# Patient Record
Sex: MALE | Race: WHITE | NOT HISPANIC OR LATINO | Employment: FULL TIME | URBAN - METROPOLITAN AREA
[De-identification: names, ages, dates, MRNs, and addresses within clinical notes are randomized per-mention and may not be internally consistent; named-entity substitution may affect disease eponyms.]

---

## 2020-08-24 ENCOUNTER — HOSPITAL ENCOUNTER (EMERGENCY)
Facility: HOSPITAL | Age: 25
Discharge: HOME | End: 2020-08-24
Attending: EMERGENCY MEDICINE | Admitting: EMERGENCY MEDICINE

## 2020-08-24 VITALS
DIASTOLIC BLOOD PRESSURE: 99 MMHG | TEMPERATURE: 98.6 F | SYSTOLIC BLOOD PRESSURE: 132 MMHG | WEIGHT: 130 LBS | BODY MASS INDEX: 16.68 KG/M2 | HEIGHT: 74 IN | OXYGEN SATURATION: 100 % | HEART RATE: 74 BPM | RESPIRATION RATE: 16 BRPM

## 2020-08-24 DIAGNOSIS — K04.7 DENTAL ABSCESS: Primary | ICD-10-CM

## 2020-08-24 DIAGNOSIS — K02.9 TOOTH DECAY: ICD-10-CM

## 2020-08-24 DIAGNOSIS — L03.211 FACIAL CELLULITIS: ICD-10-CM

## 2020-08-24 PROCEDURE — 99282 EMERGENCY DEPT VISIT SF MDM: CPT

## 2020-08-24 RX ORDER — AMOXICILLIN AND CLAVULANATE POTASSIUM 875; 125 MG/1; MG/1
1 TABLET, FILM COATED ORAL EVERY 12 HOURS
Qty: 14 TABLET | Refills: 0 | Status: SHIPPED | OUTPATIENT
Start: 2020-08-24 | End: 2020-08-31

## 2020-08-24 NOTE — ED PROVIDER NOTES
EMERGENCY DEPARTMENT ENCOUNTER      Pt Name: Wesley Edwards  MRN: [id number]  YOB: 1995  Date of evaluation: 8/24/2020  Provider: Aleks Rivera MD    CHIEF COMPLAINT       Chief Complaint   Patient presents with   • Dental Pain         HISTORY OF PRESENT ILLNESS  (Location/Symptom, Timing/Onset, Context/Setting, Quality, Duration, Modifying Factors, Severity.)   Wesley Edwards is a 25 y.o. male who presents to the emergency department with left-sided jaw swelling for the past week that he describes as moderate in severity and aching in character without any associated fever or chills and no apparent modifying factors.  He was evaluated at  and had incision and drainage performed of dental abscess but was not sent home with any antibiotics.  He is requesting antibiotic prescription today.  He does not currently have a dentist.      Nursing notes were reviewed.    REVIEW OF SYSTEMS    (2-9 systems for level 4, 10 or more for level 5)   ROS:  General:  No fevers, no chills, no weakness  Cardiovascular:  No chest pain, no palpitations  Respiratory:  No shortness of breath, no cough, no wheezing  Gastrointestinal:  No pain, no nausea, no vomiting, no diarrhea  Musculoskeletal:  No muscle pain, no joint pain  Skin:  No rash, no easy bruising  Neurologic:  No speech problems, no headache, no extremity numbness, no extremity tingling, no extremity weakness  Psychiatric:  No anxiety  Genitourinary:  No dysuria, no hematuria    Except as noted above the remainder of the review of systems was reviewed and negative.       PAST MEDICAL HISTORY   Negative        CURRENT MEDICATIONS     No current facility-administered medications for this encounter.     Current Outpatient Medications:   •  amoxicillin-clavulanate (AUGMENTIN) 875-125 MG per tablet, Take 1 tablet by mouth Every 12 (Twelve) Hours for 7 days., Disp: 14 tablet, Rfl: 0    ALLERGIES     Patient has no known allergies.         SOCIAL HISTORY    "    Social History     Socioeconomic History   • Marital status: Significant Other     Spouse name: Not on file   • Number of children: Not on file   • Years of education: Not on file   • Highest education level: Not on file         PHYSICAL EXAM    (up to 7 for level 4, 8 or more for level 5)     Vitals:    08/24/20 1517 08/24/20 1532   BP: 132/99    Pulse: (!) 29 74   Resp: 16    Temp: 98.6 °F (37 °C)    SpO2: 100%    Weight: 59 kg (130 lb)    Height: 188 cm (74\")        Physical Exam  General: Awake, alert, no acute distress.  HEENT: Conjunctiva normal.  There is mild swelling of the left lower jawline.  Intraoral examination reveals no evidence of fluctuance or drainable mass.  Uvula is midline.  Full range of motion of the neck without difficulty.  The floor the mouth is nontender and nonedematous.  Neck: Trachea midline.  Cardiac: Heart regular rate, rhythm, no murmurs, rubs, or gallops  Lungs: Lungs are clear to auscultation, there is no wheezing, rhonchi, or rales. There is no use of accessory muscles.  Chest wall: There is no tenderness to palpation over the chest wall or over ribs  Abdomen: Abdomen is soft, nontender, nondistended. There is no firm or pulsatile masses, no rebound rigidity or guarding.   Musculoskeletal: No deformity.  Neuro: Alert and oriented x 4.  Dermatology: Skin is warm and dry  Psych: Mentation is grossly normal, cognition is grossly normal. Affect is appropriate.        EMERGENCY DEPARTMENT COURSE and DIFFERENTIAL DIAGNOSIS/MDM:   Vitals:    Vitals:    08/24/20 1517 08/24/20 1532   BP: 132/99    Pulse: (!) 29 74   Resp: 16    Temp: 98.6 °F (37 °C)    SpO2: 100%    Weight: 59 kg (130 lb)    Height: 188 cm (74\")             Patient very well-appearing throughout the duration of his stay in the emergency department.  His examination is consistent with mild facial cellulitis secondary to dental infection.  There is no evidence of any drainable abscess as well as no evidence of deep " space infection of the head or neck such as James's angina.  Will prescribe the patient a course of Augmentin and recommend he follow-up with dentist.    I had a discussion with the patient/family regarding diagnosis, diagnostic results, treatment plan, and medications.  The patient/family indicated understanding of these instructions.  I spent adequate time at the bedside proceeding discharge necessary to personally discuss the aftercare instructions, giving patient education, providing explanations of the results of our evaluations/findings, and my decision making to assure that the patient/family understand the plan of care.  Time was allotted to answer questions at that time and throughout the ED course.  Emphasis was placed on timely follow-up after discharge.  I also discussed the potential for the development of an acute emergent condition requiring further evaluation, admission, or even surgical intervention. I discussed that we found nothing during the visit today indicating the need for further workup, admission, or the presence of an unstable medical condition.  I encouraged the patient to return to the emergency department immediately for ANY concerns, worsening, new complaints, or if symptoms persist and unable to seek follow-up in a timely fashion.  The patient/family expressed understanding and agreement with this plan.  The patient will follow-up with their PCP in 1-2 days for reevaluation.           FINAL IMPRESSION      1. Dental abscess    2. Facial cellulitis    3. Tooth decay          DISPOSITION/PLAN     ED Disposition     ED Disposition Condition Comment    Discharge Stable           PATIENT REFERRED TO:  Provider, No Known  [place] [address]    In 1 week      [facility]  [address] [address] [phone]    If symptoms worsen      DISCHARGE MEDICATIONS:     Medication List      START taking these  medications    amoxicillin-clavulanate 875-125 MG per tablet  Commonly known as:  AUGMENTIN  Take 1 tablet by mouth Every 12 (Twelve) Hours for 7 days.              Comment: Please note this report has been produced using speech recognition software.      Aleks Rivera MD  Attending Emergency Physician                       Aleks Rivera MD  08/24/20 8399

## 2021-11-20 ENCOUNTER — HOSPITAL ENCOUNTER (EMERGENCY)
Facility: HOSPITAL | Age: 26
Discharge: HOME | End: 2021-11-20
Attending: EMERGENCY MEDICINE | Admitting: EMERGENCY MEDICINE

## 2021-11-20 ENCOUNTER — APPOINTMENT (OUTPATIENT)
Dept: CT IMAGING | Facility: HOSPITAL | Age: 26
End: 2021-11-20

## 2021-11-20 VITALS
HEART RATE: 82 BPM | DIASTOLIC BLOOD PRESSURE: 79 MMHG | BODY MASS INDEX: 23.61 KG/M2 | SYSTOLIC BLOOD PRESSURE: 133 MMHG | TEMPERATURE: 98.1 F | OXYGEN SATURATION: 99 % | RESPIRATION RATE: 18 BRPM | WEIGHT: 184 LBS | HEIGHT: 74 IN

## 2021-11-20 DIAGNOSIS — N23 RENAL COLIC ON RIGHT SIDE: Primary | ICD-10-CM

## 2021-11-20 DIAGNOSIS — N20.1 RIGHT URETERAL STONE: ICD-10-CM

## 2021-11-20 LAB
ALBUMIN SERPL-MCNC: 4.1 G/DL (ref 3.5–5.2)
ALBUMIN/GLOB SERPL: 2.3 G/DL
ALP SERPL-CCNC: 54 U/L (ref 39–117)
ALT SERPL W P-5'-P-CCNC: 10 U/L (ref 1–41)
ANION GAP SERPL CALCULATED.3IONS-SCNC: 8 MMOL/L (ref 5–15)
AST SERPL-CCNC: 14 U/L (ref 1–40)
BACTERIA UR QL AUTO: ABNORMAL /HPF
BASOPHILS # BLD AUTO: 0.05 10*3/MM3 (ref 0–0.2)
BASOPHILS NFR BLD AUTO: 0.3 % (ref 0–1.5)
BILIRUB SERPL-MCNC: 0.3 MG/DL (ref 0–1.2)
BILIRUB UR QL STRIP: NEGATIVE
BUN SERPL-MCNC: 12 MG/DL (ref 6–20)
BUN/CREAT SERPL: 12.4 (ref 7–25)
CALCIUM SPEC-SCNC: 8.9 MG/DL (ref 8.6–10.5)
CHLORIDE SERPL-SCNC: 107 MMOL/L (ref 98–107)
CLARITY UR: ABNORMAL
CO2 SERPL-SCNC: 25 MMOL/L (ref 22–29)
COLOR UR: ABNORMAL
CREAT SERPL-MCNC: 0.97 MG/DL (ref 0.76–1.27)
DEPRECATED RDW RBC AUTO: 38.3 FL (ref 37–54)
EOSINOPHIL # BLD AUTO: 0.53 10*3/MM3 (ref 0–0.4)
EOSINOPHIL NFR BLD AUTO: 3.4 % (ref 0.3–6.2)
ERYTHROCYTE [DISTWIDTH] IN BLOOD BY AUTOMATED COUNT: 11.8 % (ref 12.3–15.4)
GFR SERPL CREATININE-BSD FRML MDRD: 93 ML/MIN/1.73
GLOBULIN UR ELPH-MCNC: 1.8 GM/DL
GLUCOSE SERPL-MCNC: 104 MG/DL (ref 65–99)
GLUCOSE UR STRIP-MCNC: NEGATIVE MG/DL
HCT VFR BLD AUTO: 45.2 % (ref 37.5–51)
HGB BLD-MCNC: 15.6 G/DL (ref 13–17.7)
HGB UR QL STRIP.AUTO: ABNORMAL
HOLD SPECIMEN: NORMAL
HOLD SPECIMEN: NORMAL
HYALINE CASTS UR QL AUTO: ABNORMAL /LPF
IMM GRANULOCYTES # BLD AUTO: 0.07 10*3/MM3 (ref 0–0.05)
IMM GRANULOCYTES NFR BLD AUTO: 0.4 % (ref 0–0.5)
KETONES UR QL STRIP: ABNORMAL
LEUKOCYTE ESTERASE UR QL STRIP.AUTO: ABNORMAL
LYMPHOCYTES # BLD AUTO: 1.52 10*3/MM3 (ref 0.7–3.1)
LYMPHOCYTES NFR BLD AUTO: 9.7 % (ref 19.6–45.3)
MCH RBC QN AUTO: 31 PG (ref 26.6–33)
MCHC RBC AUTO-ENTMCNC: 34.5 G/DL (ref 31.5–35.7)
MCV RBC AUTO: 89.7 FL (ref 79–97)
MONOCYTES # BLD AUTO: 0.62 10*3/MM3 (ref 0.1–0.9)
MONOCYTES NFR BLD AUTO: 4 % (ref 5–12)
NEUTROPHILS NFR BLD AUTO: 12.81 10*3/MM3 (ref 1.7–7)
NEUTROPHILS NFR BLD AUTO: 82.2 % (ref 42.7–76)
NITRITE UR QL STRIP: NEGATIVE
NRBC BLD AUTO-RTO: 0 /100 WBC (ref 0–0.2)
PH UR STRIP.AUTO: 6 [PH] (ref 5–8)
PLATELET # BLD AUTO: 265 10*3/MM3 (ref 140–450)
PMV BLD AUTO: 8.7 FL (ref 6–12)
POTASSIUM SERPL-SCNC: 3.8 MMOL/L (ref 3.5–5.2)
PROT SERPL-MCNC: 5.9 G/DL (ref 6–8.5)
PROT UR QL STRIP: ABNORMAL
RBC # BLD AUTO: 5.04 10*6/MM3 (ref 4.14–5.8)
RBC # UR STRIP: ABNORMAL /HPF
REF LAB TEST METHOD: ABNORMAL
SODIUM SERPL-SCNC: 140 MMOL/L (ref 136–145)
SP GR UR STRIP: 1.02 (ref 1–1.03)
SQUAMOUS #/AREA URNS HPF: ABNORMAL /HPF
UROBILINOGEN UR QL STRIP: ABNORMAL
WBC # UR STRIP: ABNORMAL /HPF
WBC NRBC COR # BLD: 15.6 10*3/MM3 (ref 3.4–10.8)
WHOLE BLOOD HOLD SPECIMEN: NORMAL
WHOLE BLOOD HOLD SPECIMEN: NORMAL

## 2021-11-20 PROCEDURE — 25010000002 KETOROLAC TROMETHAMINE PER 15 MG: Performed by: PHYSICIAN ASSISTANT

## 2021-11-20 PROCEDURE — 81001 URINALYSIS AUTO W/SCOPE: CPT | Performed by: EMERGENCY MEDICINE

## 2021-11-20 PROCEDURE — 74176 CT ABD & PELVIS W/O CONTRAST: CPT

## 2021-11-20 PROCEDURE — 99284 EMERGENCY DEPT VISIT MOD MDM: CPT

## 2021-11-20 PROCEDURE — 80053 COMPREHEN METABOLIC PANEL: CPT | Performed by: EMERGENCY MEDICINE

## 2021-11-20 PROCEDURE — 96374 THER/PROPH/DIAG INJ IV PUSH: CPT

## 2021-11-20 PROCEDURE — 85025 COMPLETE CBC W/AUTO DIFF WBC: CPT | Performed by: EMERGENCY MEDICINE

## 2021-11-20 RX ORDER — ONDANSETRON 4 MG/1
4 TABLET, ORALLY DISINTEGRATING ORAL EVERY 8 HOURS PRN
Qty: 12 TABLET | Refills: 0 | Status: SHIPPED | OUTPATIENT
Start: 2021-11-20

## 2021-11-20 RX ORDER — KETOROLAC TROMETHAMINE 30 MG/ML
15 INJECTION, SOLUTION INTRAMUSCULAR; INTRAVENOUS ONCE
Status: COMPLETED | OUTPATIENT
Start: 2021-11-20 | End: 2021-11-20

## 2021-11-20 RX ORDER — OXYCODONE HYDROCHLORIDE AND ACETAMINOPHEN 5; 325 MG/1; MG/1
1 TABLET ORAL EVERY 6 HOURS PRN
Qty: 12 TABLET | Refills: 0 | Status: SHIPPED | OUTPATIENT
Start: 2021-11-20

## 2021-11-20 RX ADMIN — KETOROLAC TROMETHAMINE 15 MG: 30 INJECTION, SOLUTION INTRAMUSCULAR at 22:36

## 2021-11-21 LAB — HOLD SPECIMEN: NORMAL

## 2021-11-21 NOTE — DISCHARGE INSTRUCTIONS
Push fluids, strain all urine, return for uncontrolled pain, fevers, vomiting or any other complications or problems.

## 2021-11-21 NOTE — ED PROVIDER NOTES
Subjective   26-year-old male presents emergency department today with abrupt onset of severe right flank pain.  He states that the pain began in the middle of his back pain rating around the right flank.  States the worst pain is ever had and was unable to sit still.  States he had of nausea and vomiting associated with this.  The pain abruptly went away.  He seen no blood in his urine.  He had no trauma recently.  He reports he had no previous abdominal surgeries.  No prior history of kidney stones.  Things seem to exacerbate or alleviate the pain he just seem to resolve on its own.      History provided by:  Patient   used: No    Flank Pain  Pain location:  R flank  Pain quality: aching and shooting    Pain radiates to:  Groin  Pain severity:  Severe  Onset quality:  Sudden  Timing:  Constant  Progression:  Resolved  Chronicity:  New  Context: not alcohol use, not eating, not previous surgeries, not recent illness, not recent sexual activity, not sick contacts, not suspicious food intake and not trauma    Relieved by:  Nothing  Worsened by:  Nothing  Ineffective treatments:  None tried  Associated symptoms: nausea    Associated symptoms: no anorexia, no chest pain, no chills, no constipation, no cough, no diarrhea, no fever, no hematemesis, no hematochezia, no hematuria and no sore throat    Risk factors: no alcohol abuse, not elderly, has not had multiple surgeries and no recent hospitalization        Review of Systems   Constitutional: Negative for chills and fever.   HENT: Negative for sore throat.    Respiratory: Negative for cough, chest tightness and wheezing.    Cardiovascular: Negative for chest pain and palpitations.   Gastrointestinal: Positive for nausea. Negative for anorexia, constipation, diarrhea, hematemesis and hematochezia.   Genitourinary: Positive for flank pain. Negative for hematuria.   Musculoskeletal: Negative for back pain and neck pain.   Skin: Negative for pallor and  rash.   Psychiatric/Behavioral: Negative.    All other systems reviewed and are negative.      History reviewed. No pertinent past medical history.    No Known Allergies    History reviewed. No pertinent surgical history.    History reviewed. No pertinent family history.    Social History     Socioeconomic History   • Marital status: Significant Other   Tobacco Use   • Smoking status: Current Every Day Smoker     Packs/day: 1.00     Types: Cigarettes   Substance and Sexual Activity   • Alcohol use: Yes   • Drug use: Never           Objective   Physical Exam  Vitals and nursing note reviewed.   Constitutional:       Appearance: He is well-developed.   HENT:      Head: Normocephalic and atraumatic.      Right Ear: External ear normal.      Left Ear: External ear normal.      Nose: Nose normal.   Eyes:      General: No scleral icterus.     Conjunctiva/sclera: Conjunctivae normal.      Pupils: Pupils are equal, round, and reactive to light.   Neck:      Thyroid: No thyromegaly.   Cardiovascular:      Rate and Rhythm: Normal rate and regular rhythm.      Heart sounds: Normal heart sounds.   Pulmonary:      Effort: Pulmonary effort is normal. No respiratory distress.      Breath sounds: Normal breath sounds. No wheezing or rales.   Chest:      Chest wall: No tenderness.   Abdominal:      General: Bowel sounds are normal. There is no distension.      Palpations: Abdomen is soft.      Tenderness: There is no abdominal tenderness.   Musculoskeletal:         General: Normal range of motion.      Cervical back: Normal range of motion.   Lymphadenopathy:      Cervical: No cervical adenopathy.   Skin:     General: Skin is warm and dry.   Neurological:      Mental Status: He is alert and oriented to person, place, and time.      Cranial Nerves: No cranial nerve deficit.      Coordination: Coordination normal.      Deep Tendon Reflexes: Reflexes are normal and symmetric. Reflexes normal.   Psychiatric:         Behavior: Behavior  normal.         Thought Content: Thought content normal.         Judgment: Judgment normal.         Procedures           ED Course                                 Recent Results (from the past 24 hour(s))   Urinalysis With Microscopic If Indicated (No Culture) - Urine, Clean Catch    Collection Time: 11/20/21  8:57 PM    Specimen: Urine, Clean Catch   Result Value Ref Range    Color, UA Orange (A) Yellow, Straw    Appearance, UA Cloudy (A) Clear    pH, UA 6.0 5.0 - 8.0    Specific Gravity, UA 1.023 1.001 - 1.030    Glucose, UA Negative Negative    Ketones, UA Trace (A) Negative    Bilirubin, UA Negative Negative    Blood, UA Large (3+) (A) Negative    Protein,  mg/dL (2+) (A) Negative    Leuk Esterase, UA Trace (A) Negative    Nitrite, UA Negative Negative    Urobilinogen, UA 1.0 E.U./dL 0.2 - 1.0 E.U./dL   Urinalysis, Microscopic Only - Urine, Clean Catch    Collection Time: 11/20/21  8:57 PM    Specimen: Urine, Clean Catch   Result Value Ref Range    RBC, UA Too Numerous to Count (A) None Seen, 0-2 /HPF    WBC, UA 6-12 (A) None Seen, 0-2 /HPF    Bacteria, UA None Seen None Seen, Trace /HPF    Squamous Epithelial Cells, UA 0-2 None Seen, 0-2 /HPF    Hyaline Casts, UA 7-12 0 - 6 /LPF    Methodology Automated Microscopy    Comprehensive Metabolic Panel    Collection Time: 11/20/21  8:58 PM    Specimen: Blood   Result Value Ref Range    Glucose 104 (H) 65 - 99 mg/dL    BUN 12 6 - 20 mg/dL    Creatinine 0.97 0.76 - 1.27 mg/dL    Sodium 140 136 - 145 mmol/L    Potassium 3.8 3.5 - 5.2 mmol/L    Chloride 107 98 - 107 mmol/L    CO2 25.0 22.0 - 29.0 mmol/L    Calcium 8.9 8.6 - 10.5 mg/dL    Total Protein 5.9 (L) 6.0 - 8.5 g/dL    Albumin 4.10 3.50 - 5.20 g/dL    ALT (SGPT) 10 1 - 41 U/L    AST (SGOT) 14 1 - 40 U/L    Alkaline Phosphatase 54 39 - 117 U/L    Total Bilirubin 0.3 0.0 - 1.2 mg/dL    eGFR Non African Amer 93 >60 mL/min/1.73    Globulin 1.8 gm/dL    A/G Ratio 2.3 g/dL    BUN/Creatinine Ratio 12.4 7.0 - 25.0     Anion Gap 8.0 5.0 - 15.0 mmol/L   Green Top (Gel)    Collection Time: 11/20/21  8:58 PM   Result Value Ref Range    Extra Tube Hold for add-ons.    Lavender Top    Collection Time: 11/20/21  8:58 PM   Result Value Ref Range    Extra Tube hold for add-on    Gold Top - SST    Collection Time: 11/20/21  8:58 PM   Result Value Ref Range    Extra Tube Hold for add-ons.    Gray Top    Collection Time: 11/20/21  8:58 PM   Result Value Ref Range    Extra Tube Hold for add-ons.    Light Blue Top    Collection Time: 11/20/21  8:58 PM   Result Value Ref Range    Extra Tube hold for add-on    CBC Auto Differential    Collection Time: 11/20/21  8:58 PM    Specimen: Blood   Result Value Ref Range    WBC 15.60 (H) 3.40 - 10.80 10*3/mm3    RBC 5.04 4.14 - 5.80 10*6/mm3    Hemoglobin 15.6 13.0 - 17.7 g/dL    Hematocrit 45.2 37.5 - 51.0 %    MCV 89.7 79.0 - 97.0 fL    MCH 31.0 26.6 - 33.0 pg    MCHC 34.5 31.5 - 35.7 g/dL    RDW 11.8 (L) 12.3 - 15.4 %    RDW-SD 38.3 37.0 - 54.0 fl    MPV 8.7 6.0 - 12.0 fL    Platelets 265 140 - 450 10*3/mm3    Neutrophil % 82.2 (H) 42.7 - 76.0 %    Lymphocyte % 9.7 (L) 19.6 - 45.3 %    Monocyte % 4.0 (L) 5.0 - 12.0 %    Eosinophil % 3.4 0.3 - 6.2 %    Basophil % 0.3 0.0 - 1.5 %    Immature Grans % 0.4 0.0 - 0.5 %    Neutrophils, Absolute 12.81 (H) 1.70 - 7.00 10*3/mm3    Lymphocytes, Absolute 1.52 0.70 - 3.10 10*3/mm3    Monocytes, Absolute 0.62 0.10 - 0.90 10*3/mm3    Eosinophils, Absolute 0.53 (H) 0.00 - 0.40 10*3/mm3    Basophils, Absolute 0.05 0.00 - 0.20 10*3/mm3    Immature Grans, Absolute 0.07 (H) 0.00 - 0.05 10*3/mm3    nRBC 0.0 0.0 - 0.2 /100 WBC     Note: In addition to lab results from this visit, the labs listed above may include labs taken at another facility or during a different encounter within the last 24 hours. Please correlate lab times with ED admission and discharge times for further clarification of the services performed during this visit.    CT Abdomen Pelvis Without  "Contrast   Final Result   #1 there is a 1 to 2 mm obstructing calculus in the right sacral ureter with mild right hydroureter and fullness of the right renal collecting system. There is a nonobstructing 2 to 3 mm calculus in the right lower pole kidney.   2. Study is otherwise limited by lack of IV contrast media but grossly unremarkable            Signer Name: Мария Zaragoza MD    Signed: 11/20/2021 9:55 PM    Workstation Name: [email or web address]     [facility]        Vitals:    11/20/21 2042 11/20/21 2045 11/20/21 2100   BP: 127/75 130/78 133/79   BP Location: Right arm     Patient Position: Sitting     Pulse: 82     Resp: 18     Temp: 98.1 °F (36.7 °C)     TempSrc: Oral     SpO2: 97% 98% 99%   Weight: 83.5 kg (184 lb)     Height: 188 cm (74\")       Medications   ketorolac (TORADOL) injection 15 mg (15 mg Intravenous Given 11/20/21 2236)     ECG/EMG Results (last 24 hours)     ** No results found for the last 24 hours. **        No orders to display                 MDM  Number of Diagnoses or Management Options  Renal colic on right side: new and requires workup  Right ureteral stone: new and requires workup     Amount and/or Complexity of Data Reviewed  Clinical lab tests: reviewed and ordered  Tests in the radiology section of CPT®: reviewed and ordered  Tests in the medicine section of CPT®: ordered and reviewed  Discuss the patient with other providers: yes    Patient Progress  Patient progress: stable      Final diagnoses:   Renal colic on right side   Right ureteral stone       ED Disposition  ED Disposition     ED Disposition Condition Comment    Discharge Stable           Ben Baez MD  [address] [address] [phone]      Call for appointment         Medication List      New Prescriptions    ondansetron ODT 4 MG disintegrating tablet  Commonly known as: ZOFRAN-ODT  Place 1 tablet on the tongue Every 8 (Eight) Hours As Needed for Nausea.   "   oxyCODONE-acetaminophen 5-325 MG per tablet  Commonly known as: PERCOCET  Take 1 tablet by mouth Every 6 (Six) Hours As Needed for Severe Pain .           Where to Get Your Medications      These medications were sent to [facility]/pharmacy #[id number] - [place], [place] - [address] - [phone]  - [phone]   [address] [facility] [address]    Phone: [phone]   · ondansetron ODT 4 MG disintegrating tablet  · oxyCODONE-acetaminophen 5-325 MG per tablet          Janusz Nguyen PA  11/21/21 2006

## 2025-06-21 ENCOUNTER — HOSPITAL ENCOUNTER (EMERGENCY)
Facility: HOSPITAL | Age: 30
Discharge: HOME | End: 2025-06-21
Attending: EMERGENCY MEDICINE
Payer: MEDICAID

## 2025-06-21 VITALS
HEART RATE: 62 BPM | RESPIRATION RATE: 16 BRPM | HEIGHT: 74 IN | SYSTOLIC BLOOD PRESSURE: 146 MMHG | TEMPERATURE: 98.1 F | BODY MASS INDEX: 26.69 KG/M2 | DIASTOLIC BLOOD PRESSURE: 92 MMHG | OXYGEN SATURATION: 98 % | WEIGHT: 208 LBS

## 2025-06-21 DIAGNOSIS — R07.9 NONSPECIFIC CHEST PAIN: Primary | ICD-10-CM

## 2025-06-21 LAB
ALBUMIN SERPL-MCNC: 4.4 G/DL (ref 3.5–5.2)
ALBUMIN/GLOB SERPL: 2 G/DL
ALP SERPL-CCNC: 69 U/L (ref 39–117)
ALT SERPL W P-5'-P-CCNC: 18 U/L (ref 1–41)
ANION GAP SERPL CALCULATED.3IONS-SCNC: 12 MMOL/L (ref 5–15)
AST SERPL-CCNC: 33 U/L (ref 1–40)
BASOPHILS # BLD AUTO: 0.03 10*3/MM3 (ref 0–0.2)
BASOPHILS NFR BLD AUTO: 0.4 % (ref 0–1.5)
BILIRUB SERPL-MCNC: 0.4 MG/DL (ref 0–1.2)
BILIRUB UR QL STRIP: NEGATIVE
BUN SERPL-MCNC: 9.7 MG/DL (ref 6–20)
BUN/CREAT SERPL: 8.9 (ref 7–25)
CALCIUM SPEC-SCNC: 9.2 MG/DL (ref 8.6–10.5)
CHLORIDE SERPL-SCNC: 105 MMOL/L (ref 98–107)
CLARITY UR: CLEAR
CO2 SERPL-SCNC: 22 MMOL/L (ref 22–29)
COLOR UR: YELLOW
CREAT SERPL-MCNC: 1.09 MG/DL (ref 0.76–1.27)
DEPRECATED RDW RBC AUTO: 38.6 FL (ref 37–54)
EGFRCR SERPLBLD CKD-EPI 2021: 93.6 ML/MIN/1.73
EOSINOPHIL # BLD AUTO: 0.43 10*3/MM3 (ref 0–0.4)
EOSINOPHIL NFR BLD AUTO: 5.8 % (ref 0.3–6.2)
ERYTHROCYTE [DISTWIDTH] IN BLOOD BY AUTOMATED COUNT: 12.4 % (ref 12.3–15.4)
GLOBULIN UR ELPH-MCNC: 2.2 GM/DL
GLUCOSE SERPL-MCNC: 89 MG/DL (ref 65–99)
GLUCOSE UR STRIP-MCNC: NEGATIVE MG/DL
HCT VFR BLD AUTO: 44.1 % (ref 37.5–51)
HGB BLD-MCNC: 15.6 G/DL (ref 13–17.7)
HGB UR QL STRIP.AUTO: NEGATIVE
HOLD SPECIMEN: NORMAL
IMM GRANULOCYTES # BLD AUTO: 0.01 10*3/MM3 (ref 0–0.05)
IMM GRANULOCYTES NFR BLD AUTO: 0.1 % (ref 0–0.5)
KETONES UR QL STRIP: ABNORMAL
LEUKOCYTE ESTERASE UR QL STRIP.AUTO: NEGATIVE
LIPASE SERPL-CCNC: 16 U/L (ref 13–60)
LYMPHOCYTES # BLD AUTO: 2.46 10*3/MM3 (ref 0.7–3.1)
LYMPHOCYTES NFR BLD AUTO: 33.2 % (ref 19.6–45.3)
MCH RBC QN AUTO: 30.2 PG (ref 26.6–33)
MCHC RBC AUTO-ENTMCNC: 35.4 G/DL (ref 31.5–35.7)
MCV RBC AUTO: 85.5 FL (ref 79–97)
MONOCYTES # BLD AUTO: 0.52 10*3/MM3 (ref 0.1–0.9)
MONOCYTES NFR BLD AUTO: 7 % (ref 5–12)
NEUTROPHILS NFR BLD AUTO: 3.96 10*3/MM3 (ref 1.7–7)
NEUTROPHILS NFR BLD AUTO: 53.5 % (ref 42.7–76)
NITRITE UR QL STRIP: NEGATIVE
NRBC BLD AUTO-RTO: 0 /100 WBC (ref 0–0.2)
PH UR STRIP.AUTO: 6 [PH] (ref 5–8)
PLATELET # BLD AUTO: 316 10*3/MM3 (ref 140–450)
PMV BLD AUTO: 8.7 FL (ref 6–12)
POTASSIUM SERPL-SCNC: 4.3 MMOL/L (ref 3.5–5.2)
PROT SERPL-MCNC: 6.6 G/DL (ref 6–8.5)
PROT UR QL STRIP: ABNORMAL
QT INTERVAL: 386 MS
QTC INTERVAL: 395 MS
RBC # BLD AUTO: 5.16 10*6/MM3 (ref 4.14–5.8)
SODIUM SERPL-SCNC: 139 MMOL/L (ref 136–145)
SP GR UR STRIP: 1.03 (ref 1–1.03)
TROPONIN T SERPL HS-MCNC: <6 NG/L
UROBILINOGEN UR QL STRIP: ABNORMAL
WBC NRBC COR # BLD AUTO: 7.41 10*3/MM3 (ref 3.4–10.8)
WHOLE BLOOD HOLD COAG: NORMAL
WHOLE BLOOD HOLD SPECIMEN: NORMAL

## 2025-06-21 PROCEDURE — 93005 ELECTROCARDIOGRAM TRACING: CPT

## 2025-06-21 PROCEDURE — 36415 COLL VENOUS BLD VENIPUNCTURE: CPT

## 2025-06-21 PROCEDURE — 85025 COMPLETE CBC W/AUTO DIFF WBC: CPT | Performed by: EMERGENCY MEDICINE

## 2025-06-21 PROCEDURE — 81003 URINALYSIS AUTO W/O SCOPE: CPT | Performed by: EMERGENCY MEDICINE

## 2025-06-21 PROCEDURE — 99283 EMERGENCY DEPT VISIT LOW MDM: CPT

## 2025-06-21 PROCEDURE — 80053 COMPREHEN METABOLIC PANEL: CPT | Performed by: EMERGENCY MEDICINE

## 2025-06-21 PROCEDURE — 83690 ASSAY OF LIPASE: CPT | Performed by: EMERGENCY MEDICINE

## 2025-06-21 PROCEDURE — 84484 ASSAY OF TROPONIN QUANT: CPT

## 2025-06-21 RX ORDER — SODIUM CHLORIDE 9 MG/ML
10 INJECTION, SOLUTION INTRAMUSCULAR; INTRAVENOUS; SUBCUTANEOUS AS NEEDED
Status: DISCONTINUED | OUTPATIENT
Start: 2025-06-21 | End: 2025-06-22 | Stop reason: HOSPADM

## 2025-06-21 NOTE — Clinical Note
[facility] EMERGENCY DEPARTMENT  [address]  [facility] [address]  Phone: [phone]    Wesley Edwards was seen and treated in our emergency department on 6/21/2025.  He may return to work on 06/23/2025.         Thank you for choosing [facility].    Kwame Conrad, LUIS FERNANDO

## 2025-06-22 NOTE — ED PROVIDER NOTES
Subjective   History of Present Illness  Patient is a 29-year-old gentleman who presents to the emergency department with multiple complaints.  He reports awakening this morning with some bilateral flank pain, darkened urine output which is since improved.  He is also concerned around potential pesticide exposure, chemical toxicity.  He had applied a bug killer to an outdoor structure or package, and was not wearing gloves at the time but has endorsed that he has soap and water wash since then.  Additionally patient reported that during his work shift today he experienced the flank pain, as well as midsternal anterior chest pain, which resolved after 20 minutes.    Review of Systems   Constitutional: Negative.    HENT: Negative.     Respiratory: Negative.     Cardiovascular:  Positive for chest pain.   Gastrointestinal: Negative.    Genitourinary:  Positive for decreased urine volume and flank pain.   Neurological: Negative.        No past medical history on file.    No Known Allergies    No past surgical history on file.    No family history on file.    Social History     Socioeconomic History    Marital status: Single   Tobacco Use    Smoking status: Every Day     Current packs/day: 1.00     Types: Cigarettes   Substance and Sexual Activity    Alcohol use: Yes    Drug use: Never           Objective   Physical Exam  Constitutional:       Appearance: Normal appearance.   HENT:      Head: Normocephalic and atraumatic.      Right Ear: External ear normal.      Left Ear: External ear normal.   Eyes:      Extraocular Movements: Extraocular movements intact.      Conjunctiva/sclera: Conjunctivae normal.      Pupils: Pupils are equal, round, and reactive to light.   Cardiovascular:      Rate and Rhythm: Normal rate.      Pulses: Normal pulses.   Pulmonary:      Effort: Pulmonary effort is normal.   Abdominal:      General: Abdomen is flat. Bowel sounds are normal.      Palpations: Abdomen is soft.      Tenderness: There  is no abdominal tenderness. There is no right CVA tenderness or left CVA tenderness.   Musculoskeletal:         General: Normal range of motion.   Skin:     General: Skin is warm and dry.      Capillary Refill: Capillary refill takes less than 2 seconds.   Neurological:      General: No focal deficit present.      Mental Status: He is alert and oriented to person, place, and time.         Procedures           ED Course  ED Course as of 06/22/25 0406   Sat Jun 21, 2025 2043 Noted patient's initial vital signs, stable []   2056 CBC within normal limits.  Patient located in the ED waiting room, ambulates without difficulty at the pit area for initial evaluation. []   2157 Serum labs all negative, including troponin, urinalysis nonactionable.  Twelve-lead ECG in sinus rhythm at 63 bpm without concerning changes to the anterior leads. []   2201 Patient as to the lab results, and reevaluated in the waiting room.  He endorsed understanding of discharge. [JH]      ED Course User Index  [] Kwame Conrad APRN                                                       Medical Decision Making  Given patient's complaints, chronicity, his recent chemical exposure, and my exam, differential includes nonspecific chest pain, cannot exclude urinary tract infection or hematuria caused by calculi, although less likely.  Patient is agreeable to urinalysis, serum labs, twelve-lead ECG.  Will communicate workup results and plan his disposition.  I encouraged him to establish a primary care provider as well.    Problems Addressed:  Nonspecific chest pain: complicated acute illness or injury    Amount and/or Complexity of Data Reviewed  Labs: ordered.  ECG/medicine tests: ordered.    Risk  Prescription drug management.        Final diagnoses:   Nonspecific chest pain       ED Disposition  ED Disposition       ED Disposition   Discharge    Condition   Stable    Comment   --               PATIENT CONNECTION - [facility]  [place] [address]  [phone]             Medication List      No changes were made to your prescriptions during this visit.            Kwame Conrad, APRN  06/22/25 0416

## 2025-07-08 LAB
QT INTERVAL: 386 MS
QTC INTERVAL: 395 MS